# Patient Record
Sex: FEMALE | Race: WHITE
[De-identification: names, ages, dates, MRNs, and addresses within clinical notes are randomized per-mention and may not be internally consistent; named-entity substitution may affect disease eponyms.]

---

## 2020-07-27 ENCOUNTER — TRANSCRIPTION ENCOUNTER (OUTPATIENT)
Age: 30
End: 2020-07-27

## 2021-03-09 ENCOUNTER — TRANSCRIPTION ENCOUNTER (OUTPATIENT)
Age: 31
End: 2021-03-09

## 2022-07-12 ENCOUNTER — NON-APPOINTMENT (OUTPATIENT)
Age: 32
End: 2022-07-12

## 2023-06-13 ENCOUNTER — NON-APPOINTMENT (OUTPATIENT)
Age: 33
End: 2023-06-13

## 2024-06-03 ENCOUNTER — APPOINTMENT (OUTPATIENT)
Dept: ORTHOPEDIC SURGERY | Facility: CLINIC | Age: 34
End: 2024-06-03

## 2024-06-04 ENCOUNTER — APPOINTMENT (OUTPATIENT)
Dept: ORTHOPEDIC SURGERY | Facility: CLINIC | Age: 34
End: 2024-06-04
Payer: COMMERCIAL

## 2024-06-04 VITALS — BODY MASS INDEX: 36.7 KG/M2 | WEIGHT: 215 LBS | HEIGHT: 64 IN

## 2024-06-04 DIAGNOSIS — Z78.9 OTHER SPECIFIED HEALTH STATUS: ICD-10-CM

## 2024-06-04 DIAGNOSIS — F41.9 ANXIETY DISORDER, UNSPECIFIED: ICD-10-CM

## 2024-06-04 DIAGNOSIS — F32.A ANXIETY DISORDER, UNSPECIFIED: ICD-10-CM

## 2024-06-04 PROCEDURE — 26600 TREAT METACARPAL FRACTURE: CPT | Mod: 57

## 2024-06-04 PROCEDURE — 99024 POSTOP FOLLOW-UP VISIT: CPT

## 2024-06-04 PROCEDURE — 99203 OFFICE O/P NEW LOW 30 MIN: CPT

## 2024-06-04 PROCEDURE — 73130 X-RAY EXAM OF HAND: CPT | Mod: LT

## 2024-06-04 RX ORDER — SERTRALINE HYDROCHLORIDE 50 MG/1
50 TABLET, FILM COATED ORAL
Refills: 0 | Status: ACTIVE | COMMUNITY
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
## 2024-06-04 NOTE — ASSESSMENT
[FreeTextEntry1] : The patient was advised of the diagnosis. The natural history of the pathology was explained in full to the patient in layman's terms. All questions were answered. The risks and benefits of surgical and non-surgical treatment alternatives were explained in full to the patient.  Brace Return to office in 2 weeks - repeat hand X-Rays
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
## 2024-06-18 ENCOUNTER — APPOINTMENT (OUTPATIENT)
Dept: ORTHOPEDIC SURGERY | Facility: CLINIC | Age: 34
End: 2024-06-18
Payer: COMMERCIAL

## 2024-06-18 VITALS — BODY MASS INDEX: 36.7 KG/M2 | HEIGHT: 64 IN | WEIGHT: 215 LBS

## 2024-06-18 PROCEDURE — 73130 X-RAY EXAM OF HAND: CPT | Mod: LT

## 2024-06-18 PROCEDURE — 99024 POSTOP FOLLOW-UP VISIT: CPT
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
## 2024-06-18 NOTE — PHYSICAL EXAM
[de-identified] : L hand Good ROM  Mild swelling No deformity  Nontender  Xrays show good alignment with some healing

## 2024-07-02 ENCOUNTER — APPOINTMENT (OUTPATIENT)
Dept: ORTHOPEDIC SURGERY | Facility: CLINIC | Age: 34
End: 2024-07-02
Payer: COMMERCIAL

## 2024-07-02 VITALS — WEIGHT: 215 LBS | HEIGHT: 64 IN | BODY MASS INDEX: 36.7 KG/M2

## 2024-07-02 DIAGNOSIS — S62.357A NONDISPLACED FRACTURE OF SHAFT OF FIFTH METACARPAL BONE, LEFT HAND, INITIAL ENCOUNTER FOR CLOSED FRACTURE: ICD-10-CM

## 2024-07-02 PROCEDURE — 73130 X-RAY EXAM OF HAND: CPT | Mod: LT

## 2024-07-02 PROCEDURE — 99213 OFFICE O/P EST LOW 20 MIN: CPT

## 2024-07-30 ENCOUNTER — APPOINTMENT (OUTPATIENT)
Dept: ORTHOPEDIC SURGERY | Facility: CLINIC | Age: 34
End: 2024-07-30
Payer: COMMERCIAL

## 2024-07-30 VITALS — BODY MASS INDEX: 36.7 KG/M2 | HEIGHT: 64 IN | WEIGHT: 215 LBS

## 2024-07-30 DIAGNOSIS — S62.357A NONDISPLACED FRACTURE OF SHAFT OF FIFTH METACARPAL BONE, LEFT HAND, INITIAL ENCOUNTER FOR CLOSED FRACTURE: ICD-10-CM

## 2024-07-30 PROCEDURE — 99024 POSTOP FOLLOW-UP VISIT: CPT

## 2024-07-30 PROCEDURE — 73130 X-RAY EXAM OF HAND: CPT | Mod: LT
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
## 2024-07-30 NOTE — PHYSICAL EXAM
[de-identified] : L hand  Min swelling Nontender Good ROM Limited  strength  Xrays healing well aligned fracture

## 2024-09-10 ENCOUNTER — APPOINTMENT (OUTPATIENT)
Dept: ORTHOPEDIC SURGERY | Facility: CLINIC | Age: 34
End: 2024-09-10
Payer: COMMERCIAL

## 2024-09-10 ENCOUNTER — APPOINTMENT (OUTPATIENT)
Dept: ORTHOPEDIC SURGERY | Facility: CLINIC | Age: 34
End: 2024-09-10

## 2024-09-10 VITALS — BODY MASS INDEX: 36.7 KG/M2 | WEIGHT: 215 LBS | HEIGHT: 64 IN

## 2024-09-10 DIAGNOSIS — M54.16 RADICULOPATHY, LUMBAR REGION: ICD-10-CM

## 2024-09-10 DIAGNOSIS — S62.357A NONDISPLACED FRACTURE OF SHAFT OF FIFTH METACARPAL BONE, LEFT HAND, INITIAL ENCOUNTER FOR CLOSED FRACTURE: ICD-10-CM

## 2024-09-10 PROCEDURE — 72100 X-RAY EXAM L-S SPINE 2/3 VWS: CPT

## 2024-09-10 PROCEDURE — 73130 X-RAY EXAM OF HAND: CPT | Mod: LT

## 2024-09-10 PROCEDURE — 72170 X-RAY EXAM OF PELVIS: CPT

## 2024-09-10 PROCEDURE — 99203 OFFICE O/P NEW LOW 30 MIN: CPT | Mod: 25

## 2024-09-10 PROCEDURE — 99024 POSTOP FOLLOW-UP VISIT: CPT

## 2024-09-10 RX ORDER — METHYLPREDNISOLONE 4 MG/1
4 TABLET ORAL
Qty: 21 | Refills: 0 | Status: ACTIVE | COMMUNITY
Start: 2024-09-10 | End: 1900-01-01
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
## 2024-09-10 NOTE — PHYSICAL EXAM
[de-identified] : L hand No swelling Nontender FROM No deformity  Xrays fully healed and well aligned fx

## 2024-09-11 ENCOUNTER — TRANSCRIPTION ENCOUNTER (OUTPATIENT)
Age: 34
End: 2024-09-11

## 2024-09-15 ENCOUNTER — NON-APPOINTMENT (OUTPATIENT)
Age: 34
End: 2024-09-15

## 2024-09-16 ENCOUNTER — APPOINTMENT (OUTPATIENT)
Dept: ORTHOPEDIC SURGERY | Facility: CLINIC | Age: 34
End: 2024-09-16
Payer: COMMERCIAL

## 2024-09-16 VITALS — BODY MASS INDEX: 36.7 KG/M2 | HEIGHT: 64 IN | WEIGHT: 215 LBS

## 2024-09-16 DIAGNOSIS — M54.16 RADICULOPATHY, LUMBAR REGION: ICD-10-CM

## 2024-09-16 PROCEDURE — 99204 OFFICE O/P NEW MOD 45 MIN: CPT

## 2024-09-16 RX ORDER — PREDNISONE 10 MG/1
10 TABLET ORAL
Qty: 18 | Refills: 0 | Status: ACTIVE | COMMUNITY
Start: 2024-09-16 | End: 1900-01-01

## 2024-09-16 NOTE — HISTORY OF PRESENT ILLNESS
[de-identified] : 32 yo F with hx of L4-L5 laminectomy for radicular back pain presenting for LBP with radiation into her left lower extremity states that last saturday she was waking up, turned onto her right side and felt a pop/discomfort in her back ha had radiating pain down her LLE since that time seen at urgent care, referred for XR and MRI which patient obtained now s/p steroid dose pack, reports minimal improvement of symptoms endorses weakness raising her foot denies bowel or bladder incontinence not tkaing any addtional medication for pain at this time

## 2024-09-16 NOTE — DISCUSSION/SUMMARY
[de-identified] : Patient was seen today for evaluation and management of left-sided low back pain and notable motor weakness of the left lower extremity due to lumbar radiculopathy.  Patient is status post L4-L5 laminectomy few years ago, she has been doing well until recently.  There is no specific injury or trauma to the area, but patient is having new onset of left lumbar radiculopathy with noted motor weakness of dorsiflexion.  Patient was seen by orthopedic PA at another practice, she was referred to their spine surgeon, but she was advised that they do not accept her insurance, that is how she ended up in our practice today.  Patient had MRI completed yesterday at stand-up MRI which was reviewed with her today.  There is no official radiology report yet, but patient was educated she has significant disc protrusion, and is having left lower extremity radiculopathy and motor weakness as a result.  Patient was prescribed a Medrol Dosepak which she completed yesterday, she felt like it gave her some mild relief at the onset, but did not have much relief of the last several days.  Patient is advised that usually for lumbar radiculopathy I recommend a higher and longer course of steroids to see if it can help alleviate the acute inflammation around her nerve.  At this time recommend a course of oral prednisone with an appropriate taper (We discussed all possible side effects of this medication), patient advised to not take oral NSAIDs while on prednisone.  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  In addition, she is having new onset of motor weakness, at this time I recommend spine surgery consultation, I advised patient I will help facilitate an evaluation with one of my spine associates.  She was able to be scheduled for office evaluation on 9/19/2024.  Patient appreciates and agrees with current plan.  This note was generated using dragon medical dictation software.  A reasonable effort has been made for proofreading its contents, but typos may still remain.  If there are any questions or points of clarification needed please notify my office.

## 2024-09-16 NOTE — PHYSICAL EXAM
[de-identified] : Constitutional: Well-nourished, well-developed, No acute distress Respiratory:  Good respiratory effort, no SOB Lymphatic: No regional lymphadenopathy, no lymphedema Psychiatric: Pleasant and normal affect, alert and oriented x3 Skin: Clean dry and intact B/L LE Musculoskeletal: normal except where as noted in regional exam   Lumbar Spine Exam APPEARANCE: no marked deformities or malalignment, normal curvature of the lumbosacral spine. good posture. POSITIVE TENDERNESS: + left lower lumbar paraspinal muscles NONTENDER: no bony midline tenderness ROM: Limited flexion due to stiffness and pain, mildly limited SB/Rot RESISTIVE TESTING: + pain 4/5 resisted flex/ext, sidebending b/l, and rotation SPECIAL TESTS: + left SLR and VALENTINO, neg Trendelenburg b/l  PULSES: 2+ DP/PT pulses NEURO:  + weakness of left dorsiflexion 2+/5, otherwise L1 - S2 intact to sensation and motor, DTRs 2+/4 patella and achilles  [de-identified] : I reviewed, interpreted and clinically correlated the following outside imaging studies, Stand-up MRI MRI lumbar spine, scan performed 9/15/2024, no radiology report available as of yet My review of the images shows that there is a significant disc protrusion at L4-L5 with joint space narrowing at this level consistent with early osteoarthritis, there is narrowing of the central canal and impingement of the exiting left L4 nerve root, there is also moderate disc herniation with slight protrusion at L5-S1 as well

## 2024-09-19 ENCOUNTER — APPOINTMENT (OUTPATIENT)
Dept: ORTHOPEDIC SURGERY | Facility: CLINIC | Age: 34
End: 2024-09-19
Payer: COMMERCIAL

## 2024-09-19 VITALS
HEART RATE: 93 BPM | DIASTOLIC BLOOD PRESSURE: 91 MMHG | WEIGHT: 215 LBS | HEIGHT: 64 IN | SYSTOLIC BLOOD PRESSURE: 162 MMHG | BODY MASS INDEX: 36.7 KG/M2

## 2024-09-19 DIAGNOSIS — M51.26 OTHER INTERVERTEBRAL DISC DISPLACEMENT, LUMBAR REGION: ICD-10-CM

## 2024-09-19 PROBLEM — M54.16 LUMBAR RADICULOPATHY: Status: ACTIVE | Noted: 2024-09-19

## 2024-09-19 PROCEDURE — 99204 OFFICE O/P NEW MOD 45 MIN: CPT

## 2024-09-19 NOTE — PHYSICAL EXAM
[Normal] : Gait: normal [Paige's Sign] : negative Paige's sign [Pronator Drift] : negative pronator drift [SLR] : negative straight leg raise [de-identified] : 5 out of 5 motor strength, sensation is intact and symmetrical full range of motion flexion extension and rotation, no palpatory tenderness full range of motion of hips knees shoulders and elbows (all four extremities), no atrophy, negative straight leg raise, no pathological reflexes, no swelling, normal ambulation, no apparent distress skin is intact, no palpable lymph nodes, no upper or lower extremity instability, alert and oriented x3 and normal mood. Normal finger-to nose test.  No upper motor neuron findings. Left tibialis 4/5 strength [de-identified] : I reviewed, interpreted and clinically correlated the following outside imaging studies, MAGNETIC RESONANCE IMAGING SCAN OF THE LUMBAR SPINE   9/15/24  (Stand Up MRI)   TECHNIQUE: Multiplanar, multisequential MRI was performed in the neutral/sitting position.  HISTORY: The patient complains of lower back pain radiating to left lower extremity with numbness, weakness and difficulty walking.  FINDINGS: There is straightening of the normal lumbar lordotic curvature, possibly owing to muscle spasm.  At the L3-4 level, there is a posterior disc bulge impressing upon the thecal sac.  At the L4-5 level, suspicion is raised of prior right laminotomy surgery.  There is a partially inferiorly extruded posterior disc herniation impressing upon the thecal sac, extending along the superior L5 endplate, narrowing both lateral recesses. There is bilateral facet arthropathy.  At the L5-S1 level, suspicion is raised of a prior left laminotomy. There is a posterior disc bulge approaching the ventral thecal sac surface, extending into and narrowing the anteroinferior aspect of the right neural foramen.  There is lumbar levoscoliosis. There is disc hydration loss at L4-5 and L5-S1 manifests as decreased T2 signal. There is disc height narrowing and there are disc adjacent marrow reactive changes at L5-S1. There is anterior disc bulging at each level throughout the lumbar spine.  Examination otherwise demonstrates the remaining lumbar vertebral bodies and intervertebral discs to be unremarkable in height and signal. The conus medullaris is unremarkable in signal, morphology and position. No focal prevertebral or posterior paraspinal abnormal masses or altered signals are otherwise noted.  IMPRESSION:  * Straightening of the normal lumbar lordotic curvature, possibly owing to muscle spasm.  * L3-4 posterior disc bulge impressing upon the thecal sac.  * L4-5, suspicion is raised of prior right laminotomy surgery. Partially inferiorly extruded posterior disc herniation impressing upon the thecal sac, extending along the superior L5 endplate, narrowing both lateral recesses.  Bilateral facet arthropathy.  * L5-S1, suspicion is raised of a prior left laminotomy. Posterior disc bulge approaching the ventral thecal sac surface, extending into and narrowing the anteroinferior aspect of the right neural foramen.  * Lumbar levoscoliosis.

## 2024-09-19 NOTE — ADDENDUM
[FreeTextEntry1] : This note was written by Khris Bragg on 09/19/2024 acting as scribe for Dr. Al Driscoll M.D.  I, Al Driscoll MD, have read and attest that all the information, medical decision making and discharge instructions within are true and accurate.

## 2024-09-19 NOTE — HISTORY OF PRESENT ILLNESS
[Stable] : stable [de-identified] : 33 year old female presents for initial evaluation of lower back pain since 9/7/24. Denies injury. She states that the pain radiates down the LLE laterally to the foot, with numbness/tingling of the great toe.  Walking, sitting, laying down aggravates the pain. Was prescribed MDP and more recently prednisone which helped.  Had one visit of PT. Denies JESSE. Has MRI Lumbar. 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  No fever, chills, sweats, nausea/vomiting. No bowel or bladder dysfunction, no recent weight loss or gain. No night pain. This history is in addition to the intake form that I personally reviewed.

## 2024-09-19 NOTE — DISCUSSION/SUMMARY
[Medication Risks Reviewed] : Medication risks reviewed [Surgical risks reviewed] : Surgical risks reviewed [de-identified] : L4-5 partially extruded herniation. Left lumbar radiculopathy. Discussed all options. She has left-sided foot weakness and I did recommend surgery if she does not improve following an injection.  At this point she rather hold off on surgery and try the injection knowing that the quickest way to take pressure off the nerve would be with surgery. Referred to Dr. Chad Davis/Dr. Campuzano/Dr. Schofield for pain management. Referred to Dr. Leiva. Discussed L4-5 laminectomy discectomy vs MISS. Risks of surgery include infection, dural tear, nerve root injury, reherniation, future leg pain, future back pain, retained fragment, hematoma, urinary retention, worsening leg symptoms, foot drop, anesthetic risks, blood transfusion risks, positioning pain, visceral vascular injury, deep vein thrombosis, pulmonary embolus, and death. All risks were explained not exclusive to the ones mentioned alternatives were discussed and all questions were answered the patient agrees and understands the above and is in complete agreement with the plan. All options discussed including rest, medicine, home exercise, acupuncture, Chiropractic care, Physical Therapy, Pain management, and last resort surgery. All questions were answered, all alternatives discussed, and the patient is in complete agreement with the treatment plan which the patient contributed to and discussed with me through the shared decision-making process. Follow-up appointment as instructed. Any issues and the patient will call or come in sooner.

## 2024-09-25 ENCOUNTER — APPOINTMENT (OUTPATIENT)
Dept: ORTHOPEDIC SURGERY | Facility: CLINIC | Age: 34
End: 2024-09-25
Payer: COMMERCIAL

## 2024-09-25 DIAGNOSIS — M54.16 RADICULOPATHY, LUMBAR REGION: ICD-10-CM

## 2024-09-25 PROCEDURE — 72110 X-RAY EXAM L-2 SPINE 4/>VWS: CPT

## 2024-09-25 PROCEDURE — 99205 OFFICE O/P NEW HI 60 MIN: CPT | Mod: 25

## 2024-09-25 NOTE — ASSESSMENT
[FreeTextEntry1] : I had a long discussion with the patient regarding her treatment plan and diagnosis.  I did go over the fact that she would be a candidate for a left L4-L5 microdiscectomy.  She has significant weakness and she has a large L4-L5 disc herniation.  At this point the patient wants to avoid surgery.  She would like to try physical therapy.  She has no issues in terms of bowel bladder function, saddle anesthesia.  She will try for pain management treatment with an injection later this week.  She should also start physical therapy.  I gave her prescription for an AFO brace.  I will see her back in 2 weeks to ensure she is progressing in the right direction.  All questions were answered.

## 2024-09-25 NOTE — PHYSICAL EXAM
[de-identified] : Lumbar Physical Exam  Antalgic gait, patient is clearly dragging left ankle Reflexes Patellar - normal Gastroc - normal Clonus - No  Hip Exam - Normal  Straight leg raise - none  Pulses - 2+ dp/pt  Range of motion - normal  Sensation Sensation intact to light touch in L1, L2, L3, L4, L5 and S1 dermatomes bilaterally  Motor IP Quad HS TA Gastroc EHL Right 5/5 5/5 5/5 5/5 5/5 5/5 Left 5/5 5/5 5/5 2/5 2/5 2/5   [de-identified] : Lumbar radiographs Disc degeneration noted No instability on flexion-extension radiographs  Lumbar MRI from stand-up MRI L4-L5 extruded disc herniation

## 2024-09-25 NOTE — HISTORY OF PRESENT ILLNESS
[de-identified] : 09/25/2024 FUAD ANDERSON is a 33 year old female presenting to the office for an initial evaluation of back pain. The patient is referred here by Dr. Al Driscoll. She reports that the pain began several weeks ago.  She has had approximately 4 weeks of left ankle weakness.  She denies any bowel bladder issues.  She denies any saddle anesthesia.  She has a previous history of a hemilaminotomy done in 2019 for back and leg pain which improved substantially after surgery.

## 2024-10-16 ENCOUNTER — APPOINTMENT (OUTPATIENT)
Dept: ORTHOPEDIC SURGERY | Facility: CLINIC | Age: 34
End: 2024-10-16
Payer: COMMERCIAL

## 2024-10-16 DIAGNOSIS — M54.16 RADICULOPATHY, LUMBAR REGION: ICD-10-CM

## 2024-10-16 PROCEDURE — 99215 OFFICE O/P EST HI 40 MIN: CPT

## 2024-10-21 ENCOUNTER — NON-APPOINTMENT (OUTPATIENT)
Age: 34
End: 2024-10-21

## 2024-10-23 ENCOUNTER — NON-APPOINTMENT (OUTPATIENT)
Age: 34
End: 2024-10-23

## 2024-10-24 ENCOUNTER — OUTPATIENT (OUTPATIENT)
Dept: OUTPATIENT SERVICES | Facility: HOSPITAL | Age: 34
LOS: 1 days | End: 2024-10-24
Payer: COMMERCIAL

## 2024-10-24 VITALS
HEIGHT: 64 IN | TEMPERATURE: 98 F | DIASTOLIC BLOOD PRESSURE: 90 MMHG | HEART RATE: 76 BPM | WEIGHT: 218.92 LBS | OXYGEN SATURATION: 96 % | RESPIRATION RATE: 18 BRPM | SYSTOLIC BLOOD PRESSURE: 136 MMHG

## 2024-10-24 DIAGNOSIS — Z98.890 OTHER SPECIFIED POSTPROCEDURAL STATES: Chronic | ICD-10-CM

## 2024-10-24 DIAGNOSIS — M54.16 RADICULOPATHY, LUMBAR REGION: ICD-10-CM

## 2024-10-24 DIAGNOSIS — Z01.818 ENCOUNTER FOR OTHER PREPROCEDURAL EXAMINATION: ICD-10-CM

## 2024-10-24 LAB
ANION GAP SERPL CALC-SCNC: 14 MMOL/L — SIGNIFICANT CHANGE UP (ref 5–17)
BLD GP AB SCN SERPL QL: NEGATIVE — SIGNIFICANT CHANGE UP
BUN SERPL-MCNC: 14 MG/DL — SIGNIFICANT CHANGE UP (ref 7–23)
CALCIUM SERPL-MCNC: 9.8 MG/DL — SIGNIFICANT CHANGE UP (ref 8.4–10.5)
CHLORIDE SERPL-SCNC: 101 MMOL/L — SIGNIFICANT CHANGE UP (ref 96–108)
CO2 SERPL-SCNC: 23 MMOL/L — SIGNIFICANT CHANGE UP (ref 22–31)
CREAT SERPL-MCNC: 0.57 MG/DL — SIGNIFICANT CHANGE UP (ref 0.5–1.3)
EGFR: 121 ML/MIN/1.73M2 — SIGNIFICANT CHANGE UP
GLUCOSE SERPL-MCNC: 95 MG/DL — SIGNIFICANT CHANGE UP (ref 70–99)
HCT VFR BLD CALC: 38.8 % — SIGNIFICANT CHANGE UP (ref 34.5–45)
HGB BLD-MCNC: 13.4 G/DL — SIGNIFICANT CHANGE UP (ref 11.5–15.5)
MCHC RBC-ENTMCNC: 32.6 PG — SIGNIFICANT CHANGE UP (ref 27–34)
MCHC RBC-ENTMCNC: 34.5 GM/DL — SIGNIFICANT CHANGE UP (ref 32–36)
MCV RBC AUTO: 94.4 FL — SIGNIFICANT CHANGE UP (ref 80–100)
NRBC # BLD: 0 /100 WBCS — SIGNIFICANT CHANGE UP (ref 0–0)
PLATELET # BLD AUTO: 282 K/UL — SIGNIFICANT CHANGE UP (ref 150–400)
POTASSIUM SERPL-MCNC: 4.3 MMOL/L — SIGNIFICANT CHANGE UP (ref 3.5–5.3)
POTASSIUM SERPL-SCNC: 4.3 MMOL/L — SIGNIFICANT CHANGE UP (ref 3.5–5.3)
RBC # BLD: 4.11 M/UL — SIGNIFICANT CHANGE UP (ref 3.8–5.2)
RBC # FLD: 11.7 % — SIGNIFICANT CHANGE UP (ref 10.3–14.5)
RH IG SCN BLD-IMP: POSITIVE — SIGNIFICANT CHANGE UP
SODIUM SERPL-SCNC: 138 MMOL/L — SIGNIFICANT CHANGE UP (ref 135–145)
WBC # BLD: 8.78 K/UL — SIGNIFICANT CHANGE UP (ref 3.8–10.5)
WBC # FLD AUTO: 8.78 K/UL — SIGNIFICANT CHANGE UP (ref 3.8–10.5)

## 2024-10-24 PROCEDURE — 87641 MR-STAPH DNA AMP PROBE: CPT

## 2024-10-24 PROCEDURE — G0463: CPT

## 2024-10-24 PROCEDURE — 86901 BLOOD TYPING SEROLOGIC RH(D): CPT

## 2024-10-24 PROCEDURE — 87640 STAPH A DNA AMP PROBE: CPT

## 2024-10-24 PROCEDURE — 83036 HEMOGLOBIN GLYCOSYLATED A1C: CPT

## 2024-10-24 PROCEDURE — 86850 RBC ANTIBODY SCREEN: CPT

## 2024-10-24 PROCEDURE — 86900 BLOOD TYPING SEROLOGIC ABO: CPT

## 2024-10-24 PROCEDURE — 85027 COMPLETE CBC AUTOMATED: CPT

## 2024-10-24 PROCEDURE — 80048 BASIC METABOLIC PNL TOTAL CA: CPT

## 2024-10-24 NOTE — H&P PST ADULT - HISTORY OF PRESENT ILLNESS
33 yo female presents to PST prior to scheduled Left Revision L4-L5 Decompression Posterior  Approach on 11/7/24 with Dr. Dinh Leiva. Pmhx hemilaminectomy L4-L5 (2019, Dr. Nhi Marei, [facility]), obesity (BMI 37.4), anxiety/depression (last hospitalization in 2019). Endorses pain/numbness in left leg since beginning of September this year. Endorses weakness/instability of left foot; denies falls. Recently evaluated by Dr. Leiva and above surgery was recommended.

## 2024-10-24 NOTE — H&P PST ADULT - NSANTHOSAYNRD_GEN_A_CORE
No. KIRILL screening performed.  STOP BANG Legend: 0-2 = LOW Risk; 3-4 = INTERMEDIATE Risk; 5-8 = HIGH Risk

## 2024-10-24 NOTE — H&P PST ADULT - ATTENDING COMMENTS
Agree with above, patient with lumbar radiculopathy in the setting of L4-L5 disc herniation. We will proceed with a L4-L5 microdiscectomy    PreOp  Left EHL/TA - 3/5, significant weakness

## 2024-10-24 NOTE — H&P PST ADULT - OTHER CARE PROVIDERS
Pain management - Dr. Campuzano - epidural injection 2 weeks ago, Pain management - Dr. Campuzano - [phone]

## 2024-10-24 NOTE — H&P PST ADULT - PROBLEM SELECTOR PLAN 1
Left revision L4-L5 decompression, posterior approach on 11/7/24 with Dr. Dinh Leiva.  Pre-op instructions given. Questions answered.

## 2024-10-24 NOTE — H&P PST ADULT - NSICDXFAMILYHX_GEN_ALL_CORE_FT
FAMILY HISTORY:  Father  Still living? Unknown  FH: heart attack, Age at diagnosis: Age Unknown  FH: lung cancer, Age at diagnosis: Age Unknown    Mother  Still living? Unknown  FHx: bipolar disorder, Age at diagnosis: Age Unknown

## 2024-10-24 NOTE — H&P PST ADULT - ASSESSMENT
DASI score: 7  DASI activity: climbs 2-3 flights of stairs, can walk more than 2 blocks, all house chores (lives alone), denies limit to activity but no exercise routine  Loose teeth or denture: denies

## 2024-10-24 NOTE — H&P PST ADULT - NSICDXPASTMEDICALHX_GEN_ALL_CORE_FT
PAST MEDICAL HISTORY:  Anxiety and depression     H/O radiculopathy     Obesity      PAST MEDICAL HISTORY:  Anxiety and depression     H/O radiculopathy     History of enlarged tonsils     Obesity

## 2024-10-25 LAB
A1C WITH ESTIMATED AVERAGE GLUCOSE RESULT: 5.7 % — HIGH (ref 4–5.6)
ESTIMATED AVERAGE GLUCOSE: 117 MG/DL — HIGH (ref 68–114)
MRSA PCR RESULT.: SIGNIFICANT CHANGE UP
S AUREUS DNA NOSE QL NAA+PROBE: SIGNIFICANT CHANGE UP

## 2024-11-01 ENCOUNTER — APPOINTMENT (OUTPATIENT)
Dept: ORTHOPEDIC SURGERY | Facility: CLINIC | Age: 34
End: 2024-11-01
Payer: COMMERCIAL

## 2024-11-01 VITALS
HEIGHT: 64 IN | HEART RATE: 65 BPM | SYSTOLIC BLOOD PRESSURE: 160 MMHG | WEIGHT: 215 LBS | BODY MASS INDEX: 36.7 KG/M2 | DIASTOLIC BLOOD PRESSURE: 92 MMHG

## 2024-11-01 DIAGNOSIS — M54.16 RADICULOPATHY, LUMBAR REGION: ICD-10-CM

## 2024-11-01 PROCEDURE — 99215 OFFICE O/P EST HI 40 MIN: CPT

## 2024-11-01 RX ORDER — DOCUSATE SODIUM 100 MG/1
100 CAPSULE ORAL
Qty: 21 | Refills: 0 | Status: ACTIVE | COMMUNITY
Start: 2024-11-01 | End: 1900-01-01

## 2024-11-01 RX ORDER — TRAMADOL HYDROCHLORIDE 50 MG/1
50 TABLET, COATED ORAL
Qty: 20 | Refills: 0 | Status: ACTIVE | COMMUNITY
Start: 2024-11-01 | End: 1900-01-01

## 2024-11-01 RX ORDER — KETOROLAC TROMETHAMINE 10 MG/1
10 TABLET, FILM COATED ORAL
Qty: 18 | Refills: 0 | Status: ACTIVE | COMMUNITY
Start: 2024-11-01 | End: 1900-01-01

## 2024-11-01 RX ORDER — ONDANSETRON 4 MG/1
4 TABLET ORAL
Qty: 15 | Refills: 0 | Status: ACTIVE | COMMUNITY
Start: 2024-11-01 | End: 1900-01-01

## 2024-11-01 RX ORDER — PANTOPRAZOLE 40 MG/1
40 TABLET, DELAYED RELEASE ORAL DAILY
Qty: 14 | Refills: 1 | Status: ACTIVE | COMMUNITY
Start: 2024-11-01 | End: 1900-01-01

## 2024-11-01 RX ORDER — METHYLPREDNISOLONE 4 MG/1
4 TABLET ORAL
Qty: 1 | Refills: 0 | Status: ACTIVE | COMMUNITY
Start: 2024-11-01 | End: 1900-01-01

## 2024-11-01 RX ORDER — TIZANIDINE 2 MG/1
2 TABLET ORAL EVERY 6 HOURS
Qty: 56 | Refills: 1 | Status: ACTIVE | COMMUNITY
Start: 2024-11-01 | End: 1900-01-01

## 2024-11-01 RX ORDER — SENNOSIDES 8.6 MG/1
8.6 CAPSULE, GELATIN COATED ORAL
Qty: 14 | Refills: 0 | Status: ACTIVE | COMMUNITY
Start: 2024-11-01 | End: 1900-01-01

## 2024-11-07 ENCOUNTER — TRANSCRIPTION ENCOUNTER (OUTPATIENT)
Age: 34
End: 2024-11-07

## 2024-11-07 ENCOUNTER — OUTPATIENT (OUTPATIENT)
Dept: OUTPATIENT SERVICES | Facility: HOSPITAL | Age: 34
LOS: 1 days | End: 2024-11-07
Payer: COMMERCIAL

## 2024-11-07 ENCOUNTER — RESULT REVIEW (OUTPATIENT)
Age: 34
End: 2024-11-07

## 2024-11-07 ENCOUNTER — APPOINTMENT (OUTPATIENT)
Dept: ORTHOPEDIC SURGERY | Facility: HOSPITAL | Age: 34
End: 2024-11-07

## 2024-11-07 VITALS
OXYGEN SATURATION: 100 % | TEMPERATURE: 97 F | WEIGHT: 218.92 LBS | RESPIRATION RATE: 15 BRPM | DIASTOLIC BLOOD PRESSURE: 84 MMHG | HEIGHT: 64 IN | SYSTOLIC BLOOD PRESSURE: 158 MMHG | HEART RATE: 66 BPM

## 2024-11-07 VITALS
OXYGEN SATURATION: 100 % | HEART RATE: 71 BPM | DIASTOLIC BLOOD PRESSURE: 82 MMHG | RESPIRATION RATE: 15 BRPM | SYSTOLIC BLOOD PRESSURE: 129 MMHG

## 2024-11-07 DIAGNOSIS — Z98.890 OTHER SPECIFIED POSTPROCEDURAL STATES: Chronic | ICD-10-CM

## 2024-11-07 DIAGNOSIS — M54.16 RADICULOPATHY, LUMBAR REGION: ICD-10-CM

## 2024-11-07 LAB
GLUCOSE BLDC GLUCOMTR-MCNC: 100 MG/DL — HIGH (ref 70–99)
RH IG SCN BLD-IMP: POSITIVE — SIGNIFICANT CHANGE UP

## 2024-11-07 PROCEDURE — C9399: CPT

## 2024-11-07 PROCEDURE — 63042 LAMINOTOMY SINGLE LUMBAR: CPT | Mod: 50

## 2024-11-07 PROCEDURE — 97162 PT EVAL MOD COMPLEX 30 MIN: CPT

## 2024-11-07 PROCEDURE — 88304 TISSUE EXAM BY PATHOLOGIST: CPT

## 2024-11-07 PROCEDURE — 88304 TISSUE EXAM BY PATHOLOGIST: CPT | Mod: 26

## 2024-11-07 PROCEDURE — 82962 GLUCOSE BLOOD TEST: CPT

## 2024-11-07 PROCEDURE — 76000 FLUOROSCOPY <1 HR PHYS/QHP: CPT

## 2024-11-07 PROCEDURE — C1889: CPT

## 2024-11-07 DEVICE — SURGIFLO MATRIX WITH THROMBIN KIT: Type: IMPLANTABLE DEVICE | Site: LEFT | Status: FUNCTIONAL

## 2024-11-07 RX ORDER — LIDOCAINE HCL 60 MG/3 ML
0.2 SYRINGE (ML) INJECTION ONCE
Refills: 0 | Status: COMPLETED | OUTPATIENT
Start: 2024-11-07 | End: 2024-11-07

## 2024-11-07 RX ORDER — NAPROXEN 250 MG/1
1 TABLET ORAL
Refills: 0 | DISCHARGE

## 2024-11-07 RX ORDER — ACETAMINOPHEN 500 MG
1000 TABLET ORAL ONCE
Refills: 0 | Status: COMPLETED | OUTPATIENT
Start: 2024-11-07 | End: 2024-11-07

## 2024-11-07 RX ORDER — CEFAZOLIN SODIUM 1 G
2000 VIAL (EA) INJECTION ONCE
Refills: 0 | Status: COMPLETED | OUTPATIENT
Start: 2024-11-07 | End: 2024-11-07

## 2024-11-07 RX ORDER — APREPITANT 40 MG/1
40 CAPSULE ORAL ONCE
Refills: 0 | Status: COMPLETED | OUTPATIENT
Start: 2024-11-07 | End: 2024-11-07

## 2024-11-07 RX ORDER — CHLORHEXIDINE GLUCONATE 40 MG/ML
1 SOLUTION TOPICAL ONCE
Refills: 0 | Status: COMPLETED | OUTPATIENT
Start: 2024-11-07 | End: 2024-11-07

## 2024-11-07 RX ORDER — B-COMPLEX WITH VITAMIN C
1 VIAL (ML) INJECTION
Refills: 0 | DISCHARGE

## 2024-11-07 RX ORDER — SODIUM CHLORIDE 9 MG/ML
3 INJECTION, SOLUTION INTRAMUSCULAR; INTRAVENOUS; SUBCUTANEOUS EVERY 8 HOURS
Refills: 0 | Status: ACTIVE | OUTPATIENT
Start: 2024-11-07 | End: 2025-10-06

## 2024-11-07 RX ORDER — SERTRALINE HYDROCHLORIDE 50 MG/1
1 TABLET, FILM COATED ORAL
Refills: 0 | DISCHARGE

## 2024-11-07 RX ADMIN — Medication 1000 MILLIGRAM(S): at 08:58

## 2024-11-07 RX ADMIN — SODIUM CHLORIDE 3 MILLILITER(S): 9 INJECTION, SOLUTION INTRAMUSCULAR; INTRAVENOUS; SUBCUTANEOUS at 08:26

## 2024-11-07 RX ADMIN — CHLORHEXIDINE GLUCONATE 1 APPLICATION(S): 40 SOLUTION TOPICAL at 08:58

## 2024-11-07 RX ADMIN — APREPITANT 40 MILLIGRAM(S): 40 CAPSULE ORAL at 08:57

## 2024-11-07 RX ADMIN — Medication 100 MILLILITER(S): at 08:58

## 2024-11-07 NOTE — PHYSICAL THERAPY INITIAL EVALUATION ADULT - PERTINENT HX OF CURRENT PROBLEM, REHAB EVAL
33 yo female presents to PST prior to scheduled Left Revision L4-L5 Decompression Posterior  Approach on 11/7/24 with Dr. Dinh Leiva. Pmhx hemilaminectomy L4-L5 (2019, Dr. Nhi Marie, [facility]), obesity (BMI 37.4), anxiety/depression (last hospitalization in 2019). Endorses pain/numbness in left leg since beginning of September this year. Endorses weakness/instability of left foot; denies falls. Recently evaluated by Dr. Leiva and above surgery was recommended.  image pending

## 2024-11-07 NOTE — ASU DISCHARGE PLAN (ADULT/PEDIATRIC) - NS MD DC FALL RISK RISK
For information on Fall & Injury Prevention, visit: [email or web address] OR  [email or web address] OR  https://www.cdc.gov/steadi/patient.html

## 2024-11-07 NOTE — PRE-ANESTHESIA EVALUATION ADULT - NSANTHOSAYNRD_GEN_A_CORE
No. KRIILL screening performed.  STOP BANG Legend: 0-2 = LOW Risk; 3-4 = INTERMEDIATE Risk; 5-8 = HIGH Risk

## 2024-11-07 NOTE — ASU DISCHARGE PLAN (ADULT/PEDIATRIC) - MODE OF TRANSPORTATION
In an effort to ensure that our patients [employer]Well, a Team Member has reviewed your chart and identified an opportunity to provide the best care possible. An attempt was made to discuss or schedule overdue Preventive or Disease Management screening.     The Outcome was Contact was not made, letter/portal message sent.  If you have any questions or need help with scheduling, contact your primary care provider.. Care Gaps include Colorectal Cancer Screening and Immunizations.     Ambulatory

## 2024-11-07 NOTE — PRE-ANESTHESIA EVALUATION ADULT - NSANTHSNORERD_ENT_A_CORE
Please bring all medicines, vitamins, and herbal supplements with you when you come to the office.    Prescriptions will not be filled unless you are compliant with your follow up appointments or have a follow up appointment scheduled as per instruction of your physician. Refills should be requested at the time of your visit. Fall Prevention Education Given    No

## 2024-11-07 NOTE — ASU DISCHARGE PLAN (ADULT/PEDIATRIC) - ASU DISCHARGE DATE/TIME
Price (Do Not Change): 0.00 Instructions: This plan will send the code FBSE to the PM system.  DO NOT or CHANGE the price. Detail Level: Simple 07-Nov-2024 14:16

## 2024-11-07 NOTE — ASU PATIENT PROFILE, ADULT - NSICDXPASTMEDICALHX_GEN_ALL_CORE_FT
PAST MEDICAL HISTORY:  Anxiety and depression     H/O radiculopathy     History of enlarged tonsils     Obesity

## 2024-11-07 NOTE — ASU DISCHARGE PLAN (ADULT/PEDIATRIC) - NURSING INSTRUCTIONS
OK to take Tylenol/Acetaminophen at 3PM TODAY (last dose @  9AM   in pre op room)  for pain and every 6 hours after as needed. OK to take Motrin/Ibuprofen at 7:15PM TODAY (last dose @ 1:15PM    in operating room) for pain and every 6 hours after as needed.

## 2024-11-07 NOTE — ASU DISCHARGE PLAN (ADULT/PEDIATRIC) - ASU DC SPECIAL INSTRUCTIONSFT
Please follow all instructions given to you by your surgeon and his office  Follow up  as scheduled  Keep dressing clean, dry, and intact as instructed. Please follow all instructions given to you by your surgeon and his office. Follow up  as scheduled. Keep dressing clean, dry, and intact as instructed.

## 2024-11-07 NOTE — PHYSICAL THERAPY INITIAL EVALUATION ADULT - ADDITIONAL COMMENTS
Pt lives in an apartment alone, after surgery will stay with parents in a private home, no steps to enter,1 flight of stairs to bedroom. Pt performed ADL/IADLs independently. Ambulates with no assistive device. Works as a traveling .

## 2024-11-07 NOTE — ASU DISCHARGE PLAN (ADULT/PEDIATRIC) - FINANCIAL ASSISTANCE
Health system provides services at a reduced cost to those who are determined to be eligible through Health system’s financial assistance program. Information regarding Health system’s financial assistance program can be found by going to [email or web address] or by calling [phone].

## 2024-11-08 ENCOUNTER — NON-APPOINTMENT (OUTPATIENT)
Age: 34
End: 2024-11-08

## 2024-11-08 PROBLEM — Z87.39 PERSONAL HISTORY OF OTHER DISEASES OF THE MUSCULOSKELETAL SYSTEM AND CONNECTIVE TISSUE: Chronic | Status: ACTIVE | Noted: 2024-10-24

## 2024-11-08 PROBLEM — E66.9 OBESITY, UNSPECIFIED: Chronic | Status: ACTIVE | Noted: 2024-10-24

## 2024-11-08 PROBLEM — F41.9 ANXIETY DISORDER, UNSPECIFIED: Chronic | Status: ACTIVE | Noted: 2024-10-24

## 2024-11-11 LAB — SURGICAL PATHOLOGY STUDY: SIGNIFICANT CHANGE UP

## 2024-11-14 ENCOUNTER — NON-APPOINTMENT (OUTPATIENT)
Age: 34
End: 2024-11-14

## 2024-11-22 ENCOUNTER — APPOINTMENT (OUTPATIENT)
Dept: ORTHOPEDIC SURGERY | Facility: CLINIC | Age: 34
End: 2024-11-22
Payer: COMMERCIAL

## 2024-11-22 VITALS — BODY MASS INDEX: 36.7 KG/M2 | HEIGHT: 64 IN | WEIGHT: 215 LBS

## 2024-11-22 DIAGNOSIS — M54.16 RADICULOPATHY, LUMBAR REGION: ICD-10-CM

## 2024-11-22 PROCEDURE — 99024 POSTOP FOLLOW-UP VISIT: CPT

## 2024-12-12 ENCOUNTER — NON-APPOINTMENT (OUTPATIENT)
Age: 34
End: 2024-12-12

## 2024-12-16 ENCOUNTER — APPOINTMENT (OUTPATIENT)
Dept: ORTHOPEDIC SURGERY | Facility: CLINIC | Age: 34
End: 2024-12-16
Payer: COMMERCIAL

## 2024-12-16 DIAGNOSIS — M54.16 RADICULOPATHY, LUMBAR REGION: ICD-10-CM

## 2024-12-16 PROCEDURE — 99024 POSTOP FOLLOW-UP VISIT: CPT

## 2025-02-05 ENCOUNTER — NON-APPOINTMENT (OUTPATIENT)
Age: 35
End: 2025-02-05

## 2025-02-05 ENCOUNTER — APPOINTMENT (OUTPATIENT)
Dept: ORTHOPEDIC SURGERY | Facility: CLINIC | Age: 35
End: 2025-02-05
Payer: COMMERCIAL

## 2025-02-05 DIAGNOSIS — M54.16 RADICULOPATHY, LUMBAR REGION: ICD-10-CM

## 2025-02-05 PROCEDURE — 99024 POSTOP FOLLOW-UP VISIT: CPT

## 2025-06-12 NOTE — PHYSICAL THERAPY INITIAL EVALUATION ADULT - RANGE OF MOTION EXAMINATION, REHAB EVAL
HDL and LDL below 100 on pravastatin  COPD/central bronchiectasis was reported on CT scan of the chest  Thoracolumbar scoliosis with DJD of the lumbar spine with chronic back   Patient is active without cardiac symptoms        TREATMENT PLAN:  1.  Reassure  2.  Continue pravastatin  3.  Patient strongly advised stopping smoking e-cigarettes  4.  Patient advised to remain active with aerobic exercise and to follow a heart healthy low cholesterol diet  5.  No further advanced cardiac testing indicated or planned   6.  Follow-up with cardiology on as-needed basis         I spent 25 minutes completing this encounter. Total time included the following:  Independently interviewing the patient (HPI, ROS, PMH, PSH, FMH, SH, allergies and medications).  Independently performing a medical appropriate examination  Ordering medications, tests and/or procedures  Formulating the assessment/plan and reviewing the rationale for the above recommendations  Reviewing available records, results of all previously ordered testing/procedures and current problem list  Counseling/educating the patient  Coordinating care with other healthcare professionals  Documenting clinical information in the patient's electronic health records         [facility]  [address] [place] [id number] [facility]. [facility] [address] [phone]          
bilateral upper extremity ROM was WFL (within functional limits)/bilateral lower extremity ROM was WFL (within functional limits)

## (undated) DEVICE — GLV 7 PROTEXIS (WHITE)

## (undated) DEVICE — DRSG DERMABOND MINI

## (undated) DEVICE — DRAPE C-ARM 41X84"

## (undated) DEVICE — VISITEC 4X4

## (undated) DEVICE — POSITIONER CUSHION INSERT PRONE VIEW LG

## (undated) DEVICE — GLV 8.5 PROTEXIS (WHITE)

## (undated) DEVICE — NDL HYPO SAFE 18G X 1.5" (PINK)

## (undated) DEVICE — DRAPE EQUIPMENT COVER 27"

## (undated) DEVICE — GLV 7.5 PROTEXIS (WHITE)

## (undated) DEVICE — DRSG TELFA 3 X 8

## (undated) DEVICE — Device

## (undated) DEVICE — DRAPE 1/2 SHEET 40X57"

## (undated) DEVICE — SPECIMEN CONTAINER 100ML

## (undated) DEVICE — DRSG CURITY GAUZE SPONGE 4 X 4" 12-PLY

## (undated) DEVICE — WARMING BLANKET UPPER ADULT

## (undated) DEVICE — DRSG STERISTRIPS 0.5 X 4"

## (undated) DEVICE — GLV 6.5 PROTEXIS (WHITE)

## (undated) DEVICE — ELCTR BAYONET PENCIL

## (undated) DEVICE — DRAPE MICROSCOPE OPMI VISIONGUARD 48X118"

## (undated) DEVICE — SUT VICRYL PLUS 2-0 18" CP-2 UNDYED (POP-OFF)

## (undated) DEVICE — DRSG TEGADERM 6"X8"

## (undated) DEVICE — GLV 8 PROTEXIS (WHITE)

## (undated) DEVICE — ELCTR BOVIE TIP CLEANER SCRATCH PAD

## (undated) DEVICE — NDL SPINAL 18G X 3.5" (PINK)

## (undated) DEVICE — DRAPE IOBAN 23" X 23"

## (undated) DEVICE — VENODYNE/SCD SLEEVE CALF LARGE

## (undated) DEVICE — FOLEY TRAY 16FR 5CC LTX UMETER CLOSED

## (undated) DEVICE — NDL HYPO SAFE 22G X 1.5" (BLACK)

## (undated) DEVICE — PREP CHLORAPREP HI-LITE ORANGE 26ML

## (undated) DEVICE — SUT MONOCRYL 3-0 18" PS-2 UNDYED

## (undated) DEVICE — SYR LUER LOK 20CC

## (undated) DEVICE — MIDAS REX MR8 MATCH HEAD FLUTED LG BORE 3MM X 14CM

## (undated) DEVICE — SUT VICRYL PLUS 0 18" OS-6 (POP-OFF)

## (undated) DEVICE — PACK LUMBAR LAMI

## (undated) DEVICE — SYR LUER LOK 3CC

## (undated) DEVICE — SOL IRR POUR NS 0.9% 500ML

## (undated) DEVICE — DRAPE C ARM C-ARMOUR

## (undated) DEVICE — SYR LUER LOK 10CC

## (undated) DEVICE — POSITIONER FOAM EGG CRATE ULNAR 2PCS (PINK)

## (undated) DEVICE — DRAPE MAYO STAND 30"

## (undated) DEVICE — CATH IV SAFE BC 20G X 1.88" (PINK)